# Patient Record
Sex: MALE | Race: WHITE | NOT HISPANIC OR LATINO | URBAN - METROPOLITAN AREA
[De-identification: names, ages, dates, MRNs, and addresses within clinical notes are randomized per-mention and may not be internally consistent; named-entity substitution may affect disease eponyms.]

---

## 2023-09-08 PROBLEM — I87.2 VENOUS INSUFFICIENCY: Status: ACTIVE | Noted: 2023-09-08

## 2023-09-08 PROBLEM — I26.94 MULTIPLE SUBSEGMENTAL PULMONARY EMBOLI WITHOUT ACUTE COR PULMONALE (MULTI): Status: ACTIVE | Noted: 2023-09-08

## 2023-09-08 PROBLEM — R91.1 SOLITARY PULMONARY NODULE: Status: ACTIVE | Noted: 2023-09-08

## 2023-09-08 PROBLEM — I26.93 SINGLE SUBSEGMENTAL PULMONARY EMBOLISM WITHOUT ACUTE COR PULMONALE (MULTI): Status: ACTIVE | Noted: 2023-09-08

## 2023-09-08 PROBLEM — I82.409 DEEP VENOUS THROMBOSIS OF LOWER EXTREMITY (MULTI): Status: ACTIVE | Noted: 2023-09-08

## 2023-09-08 PROBLEM — I48.20 CHRONIC ATRIAL FIBRILLATION (MULTI): Status: ACTIVE | Noted: 2023-09-08

## 2023-09-08 PROBLEM — I48.91 ATRIAL FIBRILLATION (MULTI): Status: ACTIVE | Noted: 2023-09-08

## 2023-09-08 PROBLEM — Z91.89 AT RISK FOR ASPIRATION: Status: ACTIVE | Noted: 2023-09-08

## 2023-09-08 PROBLEM — R73.09 ELEVATED HEMOGLOBIN A1C: Status: ACTIVE | Noted: 2023-09-08

## 2023-09-08 PROBLEM — N52.9 ERECTILE DYSFUNCTION: Status: ACTIVE | Noted: 2023-09-08

## 2023-09-08 PROBLEM — A41.9 SEPSIS (MULTI): Status: ACTIVE | Noted: 2023-09-08

## 2023-09-08 PROBLEM — Z91.89 AT RISK FOR BLEEDING: Status: ACTIVE | Noted: 2023-09-08

## 2023-09-08 PROBLEM — R03.0 FINDING OF ABOVE NORMAL BLOOD PRESSURE: Status: ACTIVE | Noted: 2023-09-08

## 2023-09-08 PROBLEM — E11.9 TYPE 2 DIABETES MELLITUS WITHOUT COMPLICATION, WITHOUT LONG-TERM CURRENT USE OF INSULIN (MULTI): Status: ACTIVE | Noted: 2023-09-08

## 2023-09-08 PROBLEM — Z92.89 HISTORY OF CARDIOVERSION: Status: ACTIVE | Noted: 2023-09-08

## 2023-09-08 RX ORDER — TRAMADOL HYDROCHLORIDE 50 MG/1
50 TABLET ORAL AS NEEDED
COMMUNITY
End: 2024-05-06 | Stop reason: ALTCHOICE

## 2023-09-08 RX ORDER — METOPROLOL SUCCINATE 25 MG/1
25 TABLET, EXTENDED RELEASE ORAL DAILY
COMMUNITY
End: 2023-12-06 | Stop reason: SDUPTHER

## 2023-09-08 RX ORDER — SILDENAFIL CITRATE 20 MG/1
20 TABLET ORAL DAILY PRN
COMMUNITY
Start: 2022-10-14 | End: 2024-03-27 | Stop reason: SDUPTHER

## 2023-12-02 DIAGNOSIS — I48.0 PAROXYSMAL ATRIAL FIBRILLATION (MULTI): Primary | ICD-10-CM

## 2023-12-06 DIAGNOSIS — I48.0 PAROXYSMAL ATRIAL FIBRILLATION (MULTI): ICD-10-CM

## 2023-12-06 RX ORDER — METOPROLOL SUCCINATE 25 MG/1
25 TABLET, EXTENDED RELEASE ORAL DAILY
Qty: 90 TABLET | Refills: 3 | Status: SHIPPED | OUTPATIENT
Start: 2023-12-06 | End: 2023-12-06

## 2023-12-06 RX ORDER — METOPROLOL SUCCINATE 25 MG/1
25 TABLET, EXTENDED RELEASE ORAL DAILY
Qty: 90 TABLET | Refills: 0 | Status: SHIPPED | OUTPATIENT
Start: 2023-12-06

## 2023-12-06 NOTE — TELEPHONE ENCOUNTER
Needs a refill on the Xarelto 20 mg, needs sent to [facility]. Metoprolol needs sent to [facility].

## 2024-03-27 ENCOUNTER — TELEPHONE (OUTPATIENT)
Dept: PRIMARY CARE | Facility: CLINIC | Age: 59
End: 2024-03-27
Payer: COMMERCIAL

## 2024-03-27 DIAGNOSIS — N52.9 ERECTILE DYSFUNCTION, UNSPECIFIED ERECTILE DYSFUNCTION TYPE: Primary | ICD-10-CM

## 2024-03-27 RX ORDER — SILDENAFIL CITRATE 20 MG/1
20 TABLET ORAL DAILY PRN
Qty: 30 TABLET | Refills: 0 | Status: SHIPPED | OUTPATIENT
Start: 2024-03-27 | End: 2024-05-06 | Stop reason: SDUPTHER

## 2024-03-27 NOTE — TELEPHONE ENCOUNTER
Patient request refill sildenafil 20mg to be sent to [facility] in [place].  Thank you  (Has appt with Dr. Escobar on 5/6)

## 2024-05-06 ENCOUNTER — OFFICE VISIT (OUTPATIENT)
Dept: PRIMARY CARE | Facility: CLINIC | Age: 59
End: 2024-05-06
Payer: COMMERCIAL

## 2024-05-06 VITALS
BODY MASS INDEX: 43.99 KG/M2 | HEIGHT: 71 IN | SYSTOLIC BLOOD PRESSURE: 140 MMHG | OXYGEN SATURATION: 95 % | TEMPERATURE: 97 F | HEART RATE: 81 BPM | DIASTOLIC BLOOD PRESSURE: 84 MMHG | WEIGHT: 314.2 LBS

## 2024-05-06 DIAGNOSIS — N52.9 ERECTILE DYSFUNCTION, UNSPECIFIED ERECTILE DYSFUNCTION TYPE: ICD-10-CM

## 2024-05-06 DIAGNOSIS — I48.91 ATRIAL FIBRILLATION, UNSPECIFIED TYPE (MULTI): ICD-10-CM

## 2024-05-06 DIAGNOSIS — Z76.89 ENCOUNTER TO ESTABLISH CARE WITH NEW DOCTOR: Primary | ICD-10-CM

## 2024-05-06 DIAGNOSIS — Z00.00 ROUTINE HEALTH MAINTENANCE: ICD-10-CM

## 2024-05-06 DIAGNOSIS — E11.9 TYPE 2 DIABETES MELLITUS WITHOUT COMPLICATION, WITHOUT LONG-TERM CURRENT USE OF INSULIN (MULTI): ICD-10-CM

## 2024-05-06 DIAGNOSIS — I87.2 VENOUS STASIS DERMATITIS OF LEFT LOWER EXTREMITY: ICD-10-CM

## 2024-05-06 DIAGNOSIS — Z12.5 PROSTATE CANCER SCREENING: ICD-10-CM

## 2024-05-06 LAB — POC HEMOGLOBIN A1C: 6.2 % (ref 4.2–6.5)

## 2024-05-06 PROCEDURE — 99213 OFFICE O/P EST LOW 20 MIN: CPT | Performed by: FAMILY MEDICINE

## 2024-05-06 PROCEDURE — 3077F SYST BP >= 140 MM HG: CPT | Performed by: FAMILY MEDICINE

## 2024-05-06 PROCEDURE — 3079F DIAST BP 80-89 MM HG: CPT | Performed by: FAMILY MEDICINE

## 2024-05-06 PROCEDURE — 83036 HEMOGLOBIN GLYCOSYLATED A1C: CPT | Performed by: FAMILY MEDICINE

## 2024-05-06 PROCEDURE — 99396 PREV VISIT EST AGE 40-64: CPT | Performed by: FAMILY MEDICINE

## 2024-05-06 RX ORDER — SILDENAFIL CITRATE 20 MG/1
20 TABLET ORAL DAILY PRN
Qty: 30 TABLET | Refills: 2 | Status: SHIPPED | OUTPATIENT
Start: 2024-05-06

## 2024-05-06 ASSESSMENT — ENCOUNTER SYMPTOMS
OCCASIONAL FEELINGS OF UNSTEADINESS: 0
DEPRESSION: 0
LOSS OF SENSATION IN FEET: 0

## 2024-05-06 ASSESSMENT — ANXIETY QUESTIONNAIRES
IF YOU CHECKED OFF ANY PROBLEMS ON THIS QUESTIONNAIRE, HOW DIFFICULT HAVE THESE PROBLEMS MADE IT FOR YOU TO DO YOUR WORK, TAKE CARE OF THINGS AT HOME, OR GET ALONG WITH OTHER PEOPLE: NOT DIFFICULT AT ALL
4. TROUBLE RELAXING: NOT AT ALL
6. BECOMING EASILY ANNOYED OR IRRITABLE: NOT AT ALL
5. BEING SO RESTLESS THAT IT IS HARD TO SIT STILL: NOT AT ALL
GAD7 TOTAL SCORE: 0
1. FEELING NERVOUS, ANXIOUS, OR ON EDGE: NOT AT ALL
2. NOT BEING ABLE TO STOP OR CONTROL WORRYING: NOT AT ALL
3. WORRYING TOO MUCH ABOUT DIFFERENT THINGS: NOT AT ALL
7. FEELING AFRAID AS IF SOMETHING AWFUL MIGHT HAPPEN: NOT AT ALL

## 2024-05-06 ASSESSMENT — PATIENT HEALTH QUESTIONNAIRE - PHQ9
7. TROUBLE CONCENTRATING ON THINGS, SUCH AS READING THE NEWSPAPER OR WATCHING TELEVISION: NOT AT ALL
6. FEELING BAD ABOUT YOURSELF - OR THAT YOU ARE A FAILURE OR HAVE LET YOURSELF OR YOUR FAMILY DOWN: NOT AT ALL
9. THOUGHTS THAT YOU WOULD BE BETTER OFF DEAD, OR OF HURTING YOURSELF: NOT AT ALL
10. IF YOU CHECKED OFF ANY PROBLEMS, HOW DIFFICULT HAVE THESE PROBLEMS MADE IT FOR YOU TO DO YOUR WORK, TAKE CARE OF THINGS AT HOME, OR GET ALONG WITH OTHER PEOPLE: NOT DIFFICULT AT ALL
3. TROUBLE FALLING OR STAYING ASLEEP OR SLEEPING TOO MUCH: NOT AT ALL
4. FEELING TIRED OR HAVING LITTLE ENERGY: NOT AT ALL
1. LITTLE INTEREST OR PLEASURE IN DOING THINGS: NOT AT ALL
SUM OF ALL RESPONSES TO PHQ QUESTIONS 1-9: 0
5. POOR APPETITE OR OVEREATING: NOT AT ALL
SUM OF ALL RESPONSES TO PHQ9 QUESTIONS 1 AND 2: 0
8. MOVING OR SPEAKING SO SLOWLY THAT OTHER PEOPLE COULD HAVE NOTICED. OR THE OPPOSITE, BEING SO FIGETY OR RESTLESS THAT YOU HAVE BEEN MOVING AROUND A LOT MORE THAN USUAL: NOT AT ALL
2. FEELING DOWN, DEPRESSED OR HOPELESS: NOT AT ALL

## 2024-05-06 ASSESSMENT — LIFESTYLE VARIABLES
HOW OFTEN DO YOU HAVE A DRINK CONTAINING ALCOHOL: MONTHLY OR LESS
HOW OFTEN DO YOU HAVE SIX OR MORE DRINKS ON ONE OCCASION: NEVER
HOW MANY STANDARD DRINKS CONTAINING ALCOHOL DO YOU HAVE ON A TYPICAL DAY: 1 OR 2
AUDIT-C TOTAL SCORE: 1
SKIP TO QUESTIONS 9-10: 1

## 2024-05-06 ASSESSMENT — COLUMBIA-SUICIDE SEVERITY RATING SCALE - C-SSRS
2. HAVE YOU ACTUALLY HAD ANY THOUGHTS OF KILLING YOURSELF?: NO
6. HAVE YOU EVER DONE ANYTHING, STARTED TO DO ANYTHING, OR PREPARED TO DO ANYTHING TO END YOUR LIFE?: NO
1. IN THE PAST MONTH, HAVE YOU WISHED YOU WERE DEAD OR WISHED YOU COULD GO TO SLEEP AND NOT WAKE UP?: NO

## 2024-05-06 ASSESSMENT — PAIN SCALES - GENERAL: PAINLEVEL: 0-NO PAIN

## 2024-05-06 NOTE — PATIENT INSTRUCTIONS
Problem List Items Addressed This Visit             ICD-10-CM    Type 2 diabetes mellitus without complication, without long-term current use of insulin (Multi) E11.9    Relevant Orders    POCT glycosylated hemoglobin (Hb A1C) manually resulted    Comprehensive Metabolic Panel    Lipid Panel    CBC    TSH with reflex to Free T4 if abnormal     Other Visit Diagnoses         Codes    Encounter to establish care with new doctor    -  Primary Z76.89    Routine health maintenance     Z00.00    Relevant Orders    Comprehensive Metabolic Panel    Lipid Panel    CBC    TSH with reflex to Free T4 if abnormal    Prostate Specific Antigen, Screen    Prostate cancer screening     Z12.5    Relevant Orders    Prostate Specific Antigen, Screen            Additional Visit Plans:  - Complete history and physical examination was performed      GENERAL RECOMMENDATIONS:  - I encourage you to eat a low-fat, moderate-carbohydrate, low-calorie diet to maintain a normal BMI (under 25) to reduce heart disease, and risk for diabetes  - Moderate intensity exercise for 30 minutes 5 days per week is recommended  - Helpful resources recommended by the American Academy of Family Practice can be found at www.familydoctor.org or www.choosemyplate.gov  - Can also consider enrolling in a program such as Weight Watchers or Teresa Chan. The most effective diet is going to be one you can follow long term and turn into a lifestyle. The CDC recommends losing 0.5-2 lbs a week if overweight or obese.  - I recommend a routine vision check and dental cleanings every 6 months      BLOOD TESTING:  - We will obtain routine blood work, please have this done when you are fasting. The office will notify you of the test results once they are available and make any treatment recommendations accordingly  - Blood work may include a cholesterol and diabetes screen if risk factors exist (overweight, high blood pressure etc); screening for sexually transmitted infections;  and Hepatitis C Virus screening      VACCINATION RECOMMENDATIONS:  - Flu shot annually.  - Tetanus booster every 10 years. Reportedly up to date  - Two pneumonia vaccinations starting at 65 years old (or earlier if risk factors - smoker, diabetic, heart or lung conditions) - recommended due to your diabetes - you will think about this.  - Shingles vaccine for those 50 years or older - you want to think about this      SCREENING RECOMMENDATIONS:  - Colon cancer screening (with colonoscopy or Cologuard) for men and women starting at age 45 until 74 years old (or earlier if family history of colon cancer) - next due 2027      Next Wellness Exam/Annual Physical Due  In 1 year from today    Patient Care Team:  Hira Escobar DO as PCP - General (Family Medicine)  Magdalena Ahn MD as PCP - [facility] PCP    Hira Escobar DO   05/06/24   11:28 AM

## 2024-05-06 NOTE — PROGRESS NOTES
Outpatient Visit Note    Chief Complaint   Patient presents with    Annual Exam       HPI:  Campos Robison is a 59 y.o. male here for a complete physical and to establish care.     He would also like refills on his sildenafil.  He uses this for erectile dysfunction with good results.  Denies any concerning side effects.    Health Maintenance.  Immunizations: Believes Tdap is up to date. Will think about the shingles vaccine. History of Bell's Palsy with getting the COVID vaccine. Declining pneumonia vaccine.    Denies smoking or illicit drug use, drinks 1 alcoholic beverages a day. Patient reports routine vision checks and dental cleanings, and does not get regular exercise. Patient is not fasting for routine blood work today.    Screening colonoscopy done in 2022 with Dr. Yip with 5 year clearance. Next due 2027.     Otherwise denies fevers, chills, cold/flu symptoms, SOB, CP, N/V, abdominal pain, dysuria, hematuria, melena, diarrhea or LE edema      PHQ9/GAD7:  Over the past 2 weeks, how often have you been bothered by any of the following problems?  Trouble falling or staying asleep, or sleeping too much: Not at all  Feeling tired or having little energy: Not at all  Poor appetite or overeating: Not at all  Feeling bad about yourself - or that you are a failure or have let yourself or your family down: Not at all  Trouble concentrating on things, such as reading the newspaper or watching television: Not at all  Moving or speaking so slowly that other people could have noticed? Or the opposite - being so fidgety or restless that you have been moving around a lot more than usual.: Not at all  Thoughts that you would be better off dead or hurting yourself in some way: Not at all  Patient Health Questionnaire-9 Score: 0  Over the last 2 weeks, how often have you been bothered by any of the following problems?  Feeling nervous, anxious, or on edge: Not at all  Not being able to stop or control  worrying: Not at all  Worrying too much about different things: Not at all  Trouble relaxing: Not at all  Being so restless that it is hard to sit still: Not at all  Becoming easily annoyed or irritable: Not at all  Feeling afraid as if something awful might happen: Not at all  ALPHONSE-7 Total Score: 0      Patient Active Problem List    Diagnosis Date Noted    At risk for aspiration 09/08/2023    At risk for bleeding 09/08/2023    History of cardioversion 09/08/2023    Erectile dysfunction 09/08/2023    Deep venous thrombosis of lower extremity (Multi) 09/08/2023    Elevated hemoglobin A1c 09/08/2023    Finding of above normal blood pressure 09/08/2023    Chronic atrial fibrillation (Multi) 09/08/2023    Atrial fibrillation (Multi) 09/08/2023    Multiple subsegmental pulmonary emboli without acute cor pulmonale (Multi) 09/08/2023    Single subsegmental pulmonary embolism without acute cor pulmonale (Multi) 09/08/2023    Sepsis (Multi) 09/08/2023    Solitary pulmonary nodule 09/08/2023    Type 2 diabetes mellitus without complication, without long-term current use of insulin (Multi) 09/08/2023    Venous insufficiency 09/08/2023        Past Medical History:   Diagnosis Date    Diabetes mellitus (Multi)         Current Medications  Current Outpatient Medications   Medication Instructions    metoprolol succinate XL (TOPROL-XL) 25 mg, oral, Daily    rivaroxaban (XARELTO) 20 mg, oral, Daily, With food    sildenafil (REVATIO) 20 mg, oral, Daily PRN, 1-5 tablets        Allergies  No Known Allergies     Immunizations  Immunization History   Administered Date(s) Administered    Pfizer Purple Cap SARS-CoV-2 02/25/2021        History reviewed. No pertinent surgical history.  Family History   Problem Relation Name Age of Onset    Pneumonia Mother      COPD Mother      Dementia Mother      Lung disease Mother      Other (viral cardiomyopathy) Father      Other (stage 4 pancreatic cancer) Father      Heart disease Father      Heart  attack Brother      Heart disease Brother       Social History     Tobacco Use    Smoking status: Never    Smokeless tobacco: Never   Substance Use Topics    Alcohol use: Yes    Drug use: Never     Tobacco Use: Low Risk  (5/6/2024)    Patient History     Smoking Tobacco Use: Never     Smokeless Tobacco Use: Never     Passive Exposure: Not on file        ROS  All pertinent positive symptoms are included in the history of present illness.  All other systems have been reviewed and are negative and noncontributory to this patient's current ailments.    VITAL SIGNS  Vitals:    05/06/24 1032   BP: 140/84   Pulse: 81   Temp: 36.1 °C (97 °F)   SpO2: 95%     Vitals:    05/06/24 1032   Weight: 143 kg (314 lb 3.2 oz)      Body mass index is 43.82 kg/m².     PHYSICAL EXAM  GENERAL APPEARANCE: well nourished, well developed, looks like stated age, in no acute distress, not ill or tired appearing, conversing well.   HEENT: no trauma, normocephalic. PERRLA and EOMI with normal external exam. TM's intact with no injection or effusion, no signs of infection.   NECK: no nodes, supple without rigidity, no neck mass was observed, no thyromegaly or thyroid nodules.   HEART: regular rate and rhythm, S1 and S2 heard with no murmurs or skipped beats, no carotid bruits.   LUNGS: clear to auscultation bilaterally with no wheezes, crackles or rales.   ABDOMEN: no organomegaly, soft, nontender, nondistended, normal bowel sounds, no guarding/rebound/rigidity.   EXTREMITIES: moving all extremities equally with no edema or deformities.   SKIN: normal skin color and pigmentation, normal skin turgor without rash, lesions, or nodules visualized.   NEUROLOGIC EXAM: CN II-XII grossly intact, normal gait, normal balance, 5/5 muscle strength  PSYCH: mood and affect appropriate; alert and oriented to time, place, person; no difficulty with speech or language.   LYMPH NODES: no cervical lymphadenopathy.             Assessment/Plan   Problem List Items  Addressed This Visit             ICD-10-CM    Type 2 diabetes mellitus without complication, without long-term current use of insulin (Multi) E11.9    Relevant Orders    POCT glycosylated hemoglobin (Hb A1C) manually resulted    Comprehensive Metabolic Panel    Lipid Panel    CBC    TSH with reflex to Free T4 if abnormal     Other Visit Diagnoses         Codes    Encounter to establish care with new doctor    -  Primary Z76.89    Routine health maintenance     Z00.00    Relevant Orders    Comprehensive Metabolic Panel    Lipid Panel    CBC    TSH with reflex to Free T4 if abnormal    Prostate Specific Antigen, Screen    Prostate cancer screening     Z12.5    Relevant Orders    Prostate Specific Antigen, Screen            Additional Visit Plans:  - Complete history and physical examination was performed      GENERAL RECOMMENDATIONS:  - I encourage you to eat a low-fat, moderate-carbohydrate, low-calorie diet to maintain a normal BMI (under 25) to reduce heart disease, and risk for diabetes  - Moderate intensity exercise for 30 minutes 5 days per week is recommended  - Helpful resources recommended by the American Academy of Family Practice can be found at www.familydoctor.org or www.choosemyplate.gov  - Can also consider enrolling in a program such as Weight Watchers or Teresa Carrilloig. The most effective diet is going to be one you can follow long term and turn into a lifestyle. The CDC recommends losing 0.5-2 lbs a week if overweight or obese.  - I recommend a routine vision check and dental cleanings every 6 months      BLOOD TESTING:  - We will obtain routine blood work, please have this done when you are fasting. The office will notify you of the test results once they are available and make any treatment recommendations accordingly  - Blood work may include a cholesterol and diabetes screen if risk factors exist (overweight, high blood pressure etc); screening for sexually transmitted infections; and Hepatitis C  Virus screening      VACCINATION RECOMMENDATIONS:  - Flu shot annually.  - Tetanus booster every 10 years. Reportedly up to date  - Two pneumonia vaccinations starting at 65 years old (or earlier if risk factors - smoker, diabetic, heart or lung conditions) - recommended due to your diabetes - you will think about this.  - Shingles vaccine for those 50 years or older - you want to think about this      SCREENING RECOMMENDATIONS:  - Colon cancer screening (with colonoscopy or Cologuard) for men and women starting at age 45 until 74 years old (or earlier if family history of colon cancer) - next due 2027    Refills provided on your Viagra.  Continue to use this as needed.      Next Wellness Exam/Annual Physical Due  In 1 year from today    Patient Care Team:  Hira Escobar DO as PCP - General (Family Medicine)  Magdalena Ahn MD as PCP - [facility] PCP    Hira Escobar DO   05/06/24   11:28 AM

## 2024-05-14 ENCOUNTER — APPOINTMENT (OUTPATIENT)
Dept: PRIMARY CARE | Facility: CLINIC | Age: 59
End: 2024-05-14
Payer: COMMERCIAL

## 2024-08-06 ENCOUNTER — APPOINTMENT (OUTPATIENT)
Dept: PRIMARY CARE | Facility: CLINIC | Age: 59
End: 2024-08-06
Payer: COMMERCIAL

## 2024-08-23 ENCOUNTER — TELEMEDICINE (OUTPATIENT)
Dept: PRIMARY CARE | Facility: CLINIC | Age: 59
End: 2024-08-23
Payer: COMMERCIAL

## 2024-08-23 DIAGNOSIS — E11.9 TYPE 2 DIABETES MELLITUS WITHOUT COMPLICATION, WITHOUT LONG-TERM CURRENT USE OF INSULIN (MULTI): Primary | ICD-10-CM

## 2024-08-23 DIAGNOSIS — I48.20 CHRONIC ATRIAL FIBRILLATION (MULTI): ICD-10-CM

## 2024-08-23 DIAGNOSIS — N52.9 ERECTILE DYSFUNCTION, UNSPECIFIED ERECTILE DYSFUNCTION TYPE: ICD-10-CM

## 2024-08-23 PROBLEM — Z86.718 HISTORY OF BLOOD CLOTS: Status: ACTIVE | Noted: 2024-08-23

## 2024-08-23 PROBLEM — Z86.718 HISTORY OF BLOOD CLOTS: Status: ACTIVE | Noted: 2023-09-08

## 2024-08-23 PROBLEM — A41.9 SEPSIS (MULTI): Status: RESOLVED | Noted: 2023-09-08 | Resolved: 2024-08-23

## 2024-08-23 PROBLEM — Z91.89 AT RISK FOR BLEEDING: Status: RESOLVED | Noted: 2023-09-08 | Resolved: 2024-08-23

## 2024-08-23 PROBLEM — R73.09 ELEVATED HEMOGLOBIN A1C: Status: RESOLVED | Noted: 2023-09-08 | Resolved: 2024-08-23

## 2024-08-23 PROBLEM — R03.0 FINDING OF ABOVE NORMAL BLOOD PRESSURE: Status: RESOLVED | Noted: 2023-09-08 | Resolved: 2024-08-23

## 2024-08-23 PROBLEM — Z91.89 AT RISK FOR ASPIRATION: Status: RESOLVED | Noted: 2023-09-08 | Resolved: 2024-08-23

## 2024-08-23 PROCEDURE — 99442 PR PHYS/QHP TELEPHONE EVALUATION 11-20 MIN: CPT | Performed by: FAMILY MEDICINE

## 2024-08-23 PROCEDURE — 1036F TOBACCO NON-USER: CPT | Performed by: FAMILY MEDICINE

## 2024-08-23 ASSESSMENT — PATIENT HEALTH QUESTIONNAIRE - PHQ9
1. LITTLE INTEREST OR PLEASURE IN DOING THINGS: NOT AT ALL
SUM OF ALL RESPONSES TO PHQ9 QUESTIONS 1 AND 2: 0
2. FEELING DOWN, DEPRESSED OR HOPELESS: NOT AT ALL

## 2024-08-23 NOTE — ASSESSMENT & PLAN NOTE
- A1c sugar average performed at last in person encounter showed great control on diet alone at 6.2%  - continue to focus on healthy, low-fat diet with moderation of carbohydrates/processed sugars  - annual diabetic eye exam advocated

## 2024-08-23 NOTE — ASSESSMENT & PLAN NOTE
- Stable on current regimen  -Will continue with current medication without modification  -At this time I have placed referral to electrophysiology department with plans to set up consultation at earliest convenience

## 2024-08-23 NOTE — PATIENT INSTRUCTIONS
Problem List Items Addressed This Visit             ICD-10-CM    Erectile dysfunction N52.9     - Stable on current regimen         Chronic atrial fibrillation (Multi) I48.20     - Stable on current regimen  -Will continue with current medication without modification  -At this time I have placed referral to electrophysiology department with plans to set up consultation at earliest convenience         Relevant Orders    Referral to Cardiac Electrophysiology    Type 2 diabetes mellitus without complication, without long-term current use of insulin (Multi) - Primary E11.9     - A1c sugar average performed at last in person encounter showed great control on diet alone at 6.2%  - continue to focus on healthy, low-fat diet with moderation of carbohydrates/processed sugars  - annual diabetic eye exam advocated          Please complete routine blood work at earliest convenience    Counseling:       Medication education:         Education:  The patient is counseled regarding potential side-effects of all new medications        Understanding:  Patient expressed understanding        Adherence:  No barriers to adherence identified    ** Please excuse any errors in grammar or translation related to this dictation. Voice recognition software was utilized to prepare this document. **

## 2024-08-23 NOTE — PROGRESS NOTES
Outpatient Visit Note    Chief Complaint   Patient presents with    Follow-up     3 month f/u       With patient's permission, this is a Telemedicine visit with video and audio. The provider and patient participated in this telemedicine encounter.    HPI:  Campos Robison is a 59 y.o. male who presents to the office via telemedicine encounter for 3-month follow-up.  Review of chart notes the patient was previously established with Dr. Ahn, having last been seen for [facility] care visit/annual physical with Dr. Iniguez on 5/6/2024.    Review of chart notes a past medical history significant for atrial fibrillation with prior PE/DVT on regimen of Xarelto and metoprolol succinate 25 mg daily.  Additional history includes diet controlled diabetes, venous insufficiency and erectile dysfunction on sildenafil.    A1c was performed at last encounter which showed good continued dietary control at 6.2%.  Orders were given at that time for routine blood work including CBC, CMP, lipid panel, TSH and PSA which have yet to be completed to date.    Atrial fibrillation:  States to be compliant with medication regimen denying any breakthrough symptoms or bruising/bleeding issues.  No reports of chest pain, palpitations, lightheadedness, shortness of breath or dizziness.  Review of chart notes that patient had previously been under the care of Dr. Rothman of  electrophysiology, respectively.  Cardioversion was initially successful though patient did have require second cardioversion which had minimal success.  He ultimately converted back into chronic A-fib to which he has been managing with medication alone.  States they have never had formal consultation with electrophysiology though he does find himself interested in having conversations at this time.  Going forward, if follow-up is ultimately needed with cardiology, he would prefer to link up with new specialist.    Controlled diabetes:  No reports of polyuria,  polydipsia, polyphagia or acute vision/sensation changes    Erectile dysfunction:  At last encounter, he did request refills on sildenafil, stating that the medication had continued to be effective and well-tolerated.  Denied any adverse side effects    Preventative health maintenance:  In regards to preventative health recommendations, patient has stated that Tdap was received in last 10 years though no record on file with state.  Patient notes to have had Bell's palsy with COVID-19 vaccine to which he has deferred ongoing immunization recommends including declining of pneumonia vaccine.  In regards to colon cancer screening, colonoscopy was successfully completed in 2022 with 5-year repeat recommended          Current Medications  Current Outpatient Medications   Medication Instructions    metoprolol succinate XL (TOPROL-XL) 25 mg, oral, Daily    sildenafil (REVATIO) 20 mg, oral, Daily PRN, 1-5 tablets    Xarelto 20 mg, oral, Daily, Take with food.        Allergies  No Known Allergies     Past Medical History:   Diagnosis Date    Diabetes mellitus (Multi)       History reviewed. No pertinent surgical history.  Family History   Problem Relation Name Age of Onset    Pneumonia Mother      COPD Mother      Dementia Mother      Lung disease Mother      Other (viral cardiomyopathy) Father      Other (stage 4 pancreatic cancer) Father      Heart disease Father      Heart attack Brother      Heart disease Brother       Social History     Tobacco Use    Smoking status: Never    Smokeless tobacco: Never   Vaping Use    Vaping status: Never Used   Substance Use Topics    Alcohol use: Yes    Drug use: Never     Tobacco Use: Low Risk  (8/23/2024)    Patient History     Smoking Tobacco Use: Never     Smokeless Tobacco Use: Never     Passive Exposure: Not on file        ROS  All pertinent positive symptoms are included in the history of present illness.  All other systems have been reviewed and are negative and noncontributory  to this patient's current ailments.    VITAL SIGNS  There were no vitals filed for this visit.  There were no vitals filed for this visit.   There is no height or weight on file to calculate BMI.   Patient is unable to provide    PHYSICAL EXAM  GENERAL APPEARANCE:  Alert and oriented x 3, Pleasant and cooperative, No acute distress.   LUNGS:  No conversational dyspnea or cough during encounter.   PSYCH:  appropriate mood and affect, no difficulty with speech.   Telemedicine visit, no other exam component done.      Assessment/Plan   Problem List Items Addressed This Visit             ICD-10-CM    Erectile dysfunction N52.9     - Stable on current regimen         Chronic atrial fibrillation (Multi) I48.20     - Stable on current regimen  -Will continue with current medication without modification  -At this time I have placed referral to electrophysiology department with plans to set up consultation at earliest convenience         Relevant Orders    Referral to Cardiac Electrophysiology    Type 2 diabetes mellitus without complication, without long-term current use of insulin (Multi) - Primary E11.9     - A1c sugar average performed at last in person encounter showed great control on diet alone at 6.2%  - continue to focus on healthy, low-fat diet with moderation of carbohydrates/processed sugars  - annual diabetic eye exam advocated          Please complete routine blood work at earliest convenience    Counseling:       Medication education:         Education:  The patient is counseled regarding potential side-effects of all new medications        Understanding:  Patient expressed understanding        Adherence:  No barriers to adherence identified    ** Please excuse any errors in grammar or translation related to this dictation. Voice recognition software was utilized to prepare this document. **

## 2024-12-17 ENCOUNTER — TELEPHONE (OUTPATIENT)
Dept: PRIMARY CARE | Facility: CLINIC | Age: 59
End: 2024-12-17
Payer: COMMERCIAL

## 2024-12-17 DIAGNOSIS — I48.0 PAROXYSMAL ATRIAL FIBRILLATION (MULTI): ICD-10-CM

## 2024-12-17 RX ORDER — METOPROLOL SUCCINATE 25 MG/1
25 TABLET, EXTENDED RELEASE ORAL DAILY
Qty: 90 TABLET | Refills: 1 | Status: SHIPPED | OUTPATIENT
Start: 2024-12-17

## 2024-12-17 NOTE — TELEPHONE ENCOUNTER
Refill request:    metoprolol succinate XL (Toprol-XL) 25 mg 24 hr tablet TAKE ONE TABLET BY MOUTH DAILY     #90 day supply    Pharmacy: [facility], [place]    Last OV- 08/23/24, 3 month med follow up    Next OV-12/30/24, 3 month medication follow up    Farhan # [phone]

## 2024-12-20 ENCOUNTER — LAB (OUTPATIENT)
Dept: LAB | Facility: LAB | Age: 59
End: 2024-12-20
Payer: COMMERCIAL

## 2024-12-20 DIAGNOSIS — E11.9 TYPE 2 DIABETES MELLITUS WITHOUT COMPLICATION, WITHOUT LONG-TERM CURRENT USE OF INSULIN (MULTI): ICD-10-CM

## 2024-12-20 DIAGNOSIS — Z12.5 PROSTATE CANCER SCREENING: ICD-10-CM

## 2024-12-20 DIAGNOSIS — Z00.00 ROUTINE HEALTH MAINTENANCE: ICD-10-CM

## 2024-12-20 LAB
ALBUMIN SERPL BCP-MCNC: 4.1 G/DL (ref 3.4–5)
ALP SERPL-CCNC: 86 U/L (ref 33–136)
ALT SERPL W P-5'-P-CCNC: 37 U/L (ref 10–52)
ANION GAP SERPL CALC-SCNC: 14 MMOL/L (ref 10–20)
AST SERPL W P-5'-P-CCNC: 23 U/L (ref 9–39)
BILIRUB SERPL-MCNC: 1.1 MG/DL (ref 0–1.2)
BUN SERPL-MCNC: 17 MG/DL (ref 6–23)
CALCIUM SERPL-MCNC: 8.8 MG/DL (ref 8.6–10.3)
CHLORIDE SERPL-SCNC: 100 MMOL/L (ref 98–107)
CHOLEST SERPL-MCNC: 230 MG/DL (ref 0–199)
CHOLESTEROL/HDL RATIO: 4.7
CO2 SERPL-SCNC: 30 MMOL/L (ref 21–32)
CREAT SERPL-MCNC: 0.91 MG/DL (ref 0.5–1.3)
EGFRCR SERPLBLD CKD-EPI 2021: >90 ML/MIN/1.73M*2
ERYTHROCYTE [DISTWIDTH] IN BLOOD BY AUTOMATED COUNT: 13.2 % (ref 11.5–14.5)
GLUCOSE SERPL-MCNC: 121 MG/DL (ref 74–99)
HCT VFR BLD AUTO: 50.8 % (ref 41–52)
HDLC SERPL-MCNC: 48.6 MG/DL
HGB BLD-MCNC: 16.5 G/DL (ref 13.5–17.5)
LDLC SERPL CALC-MCNC: 167 MG/DL
MCH RBC QN AUTO: 31.9 PG (ref 26–34)
MCHC RBC AUTO-ENTMCNC: 32.5 G/DL (ref 32–36)
MCV RBC AUTO: 98 FL (ref 80–100)
NON HDL CHOLESTEROL: 181 MG/DL (ref 0–149)
NRBC BLD-RTO: 0 /100 WBCS (ref 0–0)
PLATELET # BLD AUTO: 145 X10*3/UL (ref 150–450)
POTASSIUM SERPL-SCNC: 4.1 MMOL/L (ref 3.5–5.3)
PROT SERPL-MCNC: 6.5 G/DL (ref 6.4–8.2)
PSA SERPL-MCNC: 1.17 NG/ML
RBC # BLD AUTO: 5.17 X10*6/UL (ref 4.5–5.9)
SODIUM SERPL-SCNC: 140 MMOL/L (ref 136–145)
TRIGL SERPL-MCNC: 70 MG/DL (ref 0–149)
TSH SERPL-ACNC: 3.18 MIU/L (ref 0.44–3.98)
VLDL: 14 MG/DL (ref 0–40)
WBC # BLD AUTO: 6.2 X10*3/UL (ref 4.4–11.3)

## 2024-12-20 PROCEDURE — 84443 ASSAY THYROID STIM HORMONE: CPT

## 2024-12-20 PROCEDURE — 84153 ASSAY OF PSA TOTAL: CPT

## 2024-12-20 PROCEDURE — 80061 LIPID PANEL: CPT

## 2024-12-20 PROCEDURE — 80053 COMPREHEN METABOLIC PANEL: CPT

## 2024-12-20 PROCEDURE — 85027 COMPLETE CBC AUTOMATED: CPT

## 2024-12-20 PROCEDURE — 36415 COLL VENOUS BLD VENIPUNCTURE: CPT

## 2024-12-30 ENCOUNTER — TELEPHONE (OUTPATIENT)
Dept: PRIMARY CARE | Facility: CLINIC | Age: 59
End: 2024-12-30

## 2024-12-30 ENCOUNTER — OFFICE VISIT (OUTPATIENT)
Dept: PRIMARY CARE | Facility: CLINIC | Age: 59
End: 2024-12-30
Payer: COMMERCIAL

## 2024-12-30 VITALS
WEIGHT: 315 LBS | OXYGEN SATURATION: 98 % | DIASTOLIC BLOOD PRESSURE: 82 MMHG | TEMPERATURE: 94 F | HEART RATE: 91 BPM | SYSTOLIC BLOOD PRESSURE: 134 MMHG | BODY MASS INDEX: 44.1 KG/M2 | HEIGHT: 71 IN

## 2024-12-30 DIAGNOSIS — E11.9 TYPE 2 DIABETES MELLITUS WITHOUT COMPLICATION, WITHOUT LONG-TERM CURRENT USE OF INSULIN (MULTI): Primary | ICD-10-CM

## 2024-12-30 DIAGNOSIS — E78.00 PURE HYPERCHOLESTEROLEMIA: ICD-10-CM

## 2024-12-30 DIAGNOSIS — I48.0 PAROXYSMAL ATRIAL FIBRILLATION (MULTI): ICD-10-CM

## 2024-12-30 DIAGNOSIS — D69.6 THROMBOCYTOPENIA (CMS-HCC): ICD-10-CM

## 2024-12-30 DIAGNOSIS — I48.20 CHRONIC ATRIAL FIBRILLATION (MULTI): ICD-10-CM

## 2024-12-30 LAB — POC HEMOGLOBIN A1C: 6.4 % (ref 4.2–6.5)

## 2024-12-30 PROCEDURE — 3008F BODY MASS INDEX DOCD: CPT | Performed by: FAMILY MEDICINE

## 2024-12-30 PROCEDURE — 3079F DIAST BP 80-89 MM HG: CPT | Performed by: FAMILY MEDICINE

## 2024-12-30 PROCEDURE — 3075F SYST BP GE 130 - 139MM HG: CPT | Performed by: FAMILY MEDICINE

## 2024-12-30 PROCEDURE — 1036F TOBACCO NON-USER: CPT | Performed by: FAMILY MEDICINE

## 2024-12-30 PROCEDURE — 99214 OFFICE O/P EST MOD 30 MIN: CPT | Performed by: FAMILY MEDICINE

## 2024-12-30 PROCEDURE — 83036 HEMOGLOBIN GLYCOSYLATED A1C: CPT | Performed by: FAMILY MEDICINE

## 2024-12-30 PROCEDURE — 3050F LDL-C >= 130 MG/DL: CPT | Performed by: FAMILY MEDICINE

## 2024-12-30 RX ORDER — RIVAROXABAN 20 MG/1
20 TABLET, FILM COATED ORAL DAILY
Qty: 90 TABLET | Refills: 3 | Status: SHIPPED | OUTPATIENT
Start: 2024-12-30

## 2024-12-30 RX ORDER — TIRZEPATIDE 2.5 MG/.5ML
2.5 INJECTION, SOLUTION SUBCUTANEOUS WEEKLY
Qty: 4 PEN | Refills: 0 | Status: SHIPPED | OUTPATIENT
Start: 2024-12-30 | End: 2025-01-29

## 2024-12-30 RX ORDER — ATORVASTATIN CALCIUM 10 MG/1
10 TABLET, FILM COATED ORAL DAILY
Qty: 90 TABLET | Refills: 1 | Status: SHIPPED | OUTPATIENT
Start: 2024-12-30 | End: 2025-06-28

## 2024-12-30 ASSESSMENT — PATIENT HEALTH QUESTIONNAIRE - PHQ9
1. LITTLE INTEREST OR PLEASURE IN DOING THINGS: NOT AT ALL
2. FEELING DOWN, DEPRESSED OR HOPELESS: NOT AT ALL
SUM OF ALL RESPONSES TO PHQ9 QUESTIONS 1 AND 2: 0

## 2024-12-30 ASSESSMENT — PAIN SCALES - GENERAL: PAINLEVEL_OUTOF10: 0-NO PAIN

## 2024-12-30 NOTE — ASSESSMENT & PLAN NOTE
- Stable on current regimen  -Will continue with current medication without modification  -Cardiology referral given

## 2024-12-30 NOTE — ASSESSMENT & PLAN NOTE
- A1c sugar average performed in office today which was 6.4%; this is stable with continued good control  - continue to focus on healthy, low-fat diet with moderation of carbohydrates/processed sugars  -Secondary to ongoing struggles with inability to lose weight despite lifestyle modification, will plan to initiate trial of Mounjaro no with plans for close monitoring of tolerance/effectiveness; have additionally placed referral to clinical pharmacy team to monitor progress  - annual diabetic eye exam advocated

## 2024-12-30 NOTE — PATIENT INSTRUCTIONS
Problem List Items Addressed This Visit             ICD-10-CM    Chronic atrial fibrillation (Multi) I48.20     - Stable on current regimen  -Will continue with current medication without modification  -Cardiology referral given           Relevant Orders    Lipid panel    Referral to Cardiology    Type 2 diabetes mellitus without complication, without long-term current use of insulin (Multi) - Primary E11.9     - A1c sugar average performed in office today which was 6.4%; this is stable with continued good control  - continue to focus on healthy, low-fat diet with moderation of carbohydrates/processed sugars  -Secondary to ongoing struggles with inability to lose weight despite lifestyle modification, will plan to initiate trial of Mounjaro no with plans for close monitoring of tolerance/effectiveness; have additionally placed referral to clinical pharmacy team to monitor progress  - annual diabetic eye exam advocated         Relevant Medications    atorvastatin (Lipitor) 10 mg tablet    tirzepatide (Mounjaro) 2.5 mg/0.5 mL pen injector    Other Relevant Orders    POCT glycosylated hemoglobin (Hb A1C) manually resulted (Completed)    Lipid panel    Referral to Clinical Pharmacy    Pure hypercholesterolemia E78.00     - Blood work showed elevated cholesterol levels to which cholesterol-lowering medication is advocated; will trial atorvastatin 10 mg daily  -Continue to focus on lifestyle modification         Relevant Medications    atorvastatin (Lipitor) 10 mg tablet    Other Relevant Orders    Lipid panel    Referral to Cardiology    Thrombocytopenia (CMS-HCC) D69.6     - Blood work showed mildly reduced platelet count to which we will monitor         Relevant Orders    CBC and Auto Differential       Counseling:       Medication education:         Education:  The patient is counseled regarding potential side-effects of all new medications        Understanding:  Patient expressed understanding        Adherence:  No  barriers to adherence identified    ** Please excuse any errors in grammar or translation related to this dictation. Voice recognition software was utilized to prepare this document. **

## 2024-12-30 NOTE — TELEPHONE ENCOUNTER
LV:   FV:    Refill:  rivaroxaban (Xarelto) 20 mg tablet TAKE 1 TABLET BY MOUTH DAILY WITH FOOD     Pharm:  [facility]

## 2024-12-30 NOTE — PROGRESS NOTES
Outpatient Visit Note    Chief Complaint   Patient presents with    Follow-up     Med f/u         HPI:  Campos Robison is a 59 y.o. male who presents to the office for medication follow-up.      Patient was previously established with Dr. Ahn, having last been seen for establishing care visit/annual physical with Dr. Iniguez on 5/6/2024.  He was last seen by myself on 8/23/2024 via telemedicine encounter.    Review of chart notes a past medical history significant for atrial fibrillation with prior PE/DVT on regimen of Xarelto and metoprolol succinate 25 mg daily.  Additional history includes diet controlled diabetes, venous insufficiency and erectile dysfunction on sildenafil.    A1c was performed in May 2024 which showed good continued dietary control at 6.2%.  Blood work recently completed on 12/20/2024 including CBC, CMP, lipid panel, PSA and TSH.  Blood work was remarkable for minimally low platelet count hyperglycemia and hyperlipidemia    Atrial fibrillation:  Is compliant with medication regimen denying any breakthrough symptoms or bruising/bleeding issues.  No reports of chest pain, palpitations, lightheadedness, shortness of breath or dizziness.  Review of chart notes that patient had previously been under the care of Dr. Rothman of  electrophysiology, respectively.  Cardioversion was initially successful though patient required second cardioversion which had minimal success.  He ultimately converted back into chronic A-fib to which he has been managing with medication alone.  Referral was given for new electrophysiologist consultation at last encounter though no appointment was completed.    Controlled diabetes:  No reports of polyuria, polydipsia, polyphagia or acute vision/sensation changes.  Does state to maintain a generally stable diet though he has struggled with weight loss.  Expressed interest in trial of Zepbound, having researched the medications with his wife.    Erectile  dysfunction:  At last encounter, he did request refills on sildenafil, stating that the medication had continued to be effective and well-tolerated.  Denied any adverse side effects    Preventative health maintenance:  In regards to preventative health recommendations, patient has stated that Tdap was received in last 10 years though no record on file with state.  Patient notes to have had Bell's palsy with COVID-19 vaccine to which he has deferred ongoing immunization recommends including declining of pneumonia vaccine.  In regards to colon cancer screening, colonoscopy was successfully completed in 2022 with 5-year repeat recommended    Current Medications  Current Outpatient Medications   Medication Instructions    atorvastatin (LIPITOR) 10 mg, oral, Daily    metoprolol succinate XL (TOPROL-XL) 25 mg, oral, Daily    Mounjaro 2.5 mg, subcutaneous, Weekly    sildenafil (REVATIO) 20 mg, oral, Daily PRN, 1-5 tablets    Xarelto 20 mg, oral, Daily, Take with food.        Allergies  No Known Allergies     Past Medical History:   Diagnosis Date    Atrial fibrillation (Multi)     Diabetes mellitus (Multi)       History reviewed. No pertinent surgical history.  Family History   Problem Relation Name Age of Onset    Pneumonia Mother      COPD Mother      Dementia Mother      Lung disease Mother      Other (viral cardiomyopathy) Father      Other (stage 4 pancreatic cancer) Father      Heart disease Father      Heart attack Brother      Heart disease Brother       Social History     Tobacco Use    Smoking status: Never    Smokeless tobacco: Never   Vaping Use    Vaping status: Never Used   Substance Use Topics    Alcohol use: Yes    Drug use: Never       ROS  All pertinent positive symptoms are included in the history of present illness.  All other systems have been reviewed and are negative and noncontributory to this patient's current ailments.    VITAL SIGNS  Vitals:    12/30/24 0802   BP: 134/82   Pulse: 91   Temp: 34.4  °C (94 °F)   SpO2: 98%       PHYSICAL EXAM  GENERAL APPEARANCE: alert and oriented, Pleasant and cooperative, No Acute Distress  HEENT: EOMI, PERRLA, MMM  HEART: RRR, normal S1S2, no murmurs, click or rubs  LUNGS: clear to auscultation bilaterally, no wheezes/rhonchi/rales  EXTREMITIES: no edema, normal ROM  SKIN: normal, no rash, unremarkable  NEUROLOGIC EXAM: non-focal exam  MUSCULOSKELETAL: no gross abnormalities  PSYCH: affect is normal, eye contact is good      Assessment/Plan   Problem List Items Addressed This Visit             ICD-10-CM    Chronic atrial fibrillation (Multi) I48.20     - Stable on current regimen  -Will continue with current medication without modification  -Cardiology referral given           Relevant Orders    Lipid panel    Referral to Cardiology    Type 2 diabetes mellitus without complication, without long-term current use of insulin (Multi) - Primary E11.9     - A1c sugar average performed in office today which was 6.4%; this is stable with continued good control  - continue to focus on healthy, low-fat diet with moderation of carbohydrates/processed sugars  -Secondary to ongoing struggles with inability to lose weight despite lifestyle modification, will plan to initiate trial of Mounjaro no with plans for close monitoring of tolerance/effectiveness; have additionally placed referral to clinical pharmacy team to monitor progress  - annual diabetic eye exam advocated         Relevant Medications    atorvastatin (Lipitor) 10 mg tablet    tirzepatide (Mounjaro) 2.5 mg/0.5 mL pen injector    Other Relevant Orders    POCT glycosylated hemoglobin (Hb A1C) manually resulted (Completed)    Lipid panel    Referral to Clinical Pharmacy    Pure hypercholesterolemia E78.00     - Blood work showed elevated cholesterol levels to which cholesterol-lowering medication is advocated; will trial atorvastatin 10 mg daily  -Continue to focus on lifestyle modification         Relevant Medications     atorvastatin (Lipitor) 10 mg tablet    Other Relevant Orders    Lipid panel    Referral to Cardiology    Thrombocytopenia (CMS-[facility]) D69.6     - Blood work showed mildly reduced platelet count to which we will monitor         Relevant Orders    CBC and Auto Differential       Counseling:       Medication education:         Education:  The patient is counseled regarding potential side-effects of all new medications        Understanding:  Patient expressed understanding        Adherence:  No barriers to adherence identified    ** Please excuse any errors in grammar or translation related to this dictation. Voice recognition software was utilized to prepare this document. **

## 2025-01-07 ENCOUNTER — APPOINTMENT (OUTPATIENT)
Dept: PHARMACY | Facility: HOSPITAL | Age: 60
End: 2025-01-07
Payer: COMMERCIAL

## 2025-01-07 DIAGNOSIS — E11.9 TYPE 2 DIABETES MELLITUS WITHOUT COMPLICATION, WITHOUT LONG-TERM CURRENT USE OF INSULIN (MULTI): ICD-10-CM

## 2025-01-07 NOTE — ASSESSMENT & PLAN NOTE
Patient's goal A1c is < 7%.  Is pt at goal? Yes, 6.4  Patient's SMBGs are unknown at this time. Patient with A1C that has been increasing. Wish to start GLP1 to reduce sugars and weight loss   Rationale for plan: Start Mounjaro.    Medication Changes:  START  Mounjaro 2.5 mg once weekly

## 2025-01-07 NOTE — PROGRESS NOTES
Clinical Pharmacy Appointment    Patient ID: Campos Robison is a 59 y.o. male who presents for Diabetes.    Pt is here for First appointment.     Referring Provider/PCP: Paul Escobar, DO  Last visit with PCP: 12/30/24   Next visit with PCP: Not yet scheduled      Subjective     HPI  DIABETES MELLITUS TYPE II:    Diagnosed with diabetes: 2021. Known diabetic complications: None.  Does patient follow with Endocrinology: No  Last optometry exam: Unknown  Most recent visit in Podiatry: Unknown  Starting Weight: 322 lbs  Goal Weight: 225    Current diabetic medications include:  Mounjaro 2.5 mg once weekly on Tuesdays    Clarifications to above regimen: Has not started yet  Adverse Effects: None    Past diabetic medications include:  None    Glucose Readings:  Glucometer/CGM Type: Glucometer  Patient tests BG 0 times per day    Current home BG readings (mg/dL): Unknown   Previous home BG readings (mg/dL): N/A    Any episodes of hypoglycemia? No,   .  Did patient treat episode of hypoglycemia appropriately? N/A  Does the patient have a prescription for ready-to-use Glucagon? Not on insulin  Does pt have proteinuria? Unknown    Lifestyle:  Diet: Unknown meals/day.  BK: Unknown  LN: Unknown  DN: Unknown  Snacks: Unknown  Drinks: Unknown  Physical Activity: Unknown    Secondary Prevention:  Statin? Yes  ACE-I/ARB? No  Aspirin? No    Pertinent PMH Review:  PMH of Pancreatitis: No  PMH of Retinopathy: No  PMH of Urinary Tract Infections: No  PMH of MTC: No    Immunizations:  Influenza? No  COVID? No  Pneumonia? No  Shingles? No  Hepatitis B? No    Medication Reconciliation:  Changed:   Added:   Discontinued:     Drug Interactions  No relevant drug interactions were noted.    Medication System Management  Patients preferred pharmacy: [facility]  Adherence/Organization: No issues reported  Affordability/Accessibility: Copay for Mounjaro reports around $150. Sent link for copay card      Objective   No Known  "Allergies  Social History     Social History Narrative    Not on file      Medication Review  Current Outpatient Medications   Medication Instructions    atorvastatin (LIPITOR) 10 mg, oral, Daily    metoprolol succinate XL (TOPROL-XL) 25 mg, oral, Daily    Mounjaro 2.5 mg, subcutaneous, Weekly    sildenafil (REVATIO) 20 mg, oral, Daily PRN, 1-5 tablets    Xarelto 20 mg, oral, Daily, Take with food.      Vitals  BP Readings from Last 2 Encounters:   12/30/24 134/82   05/06/24 140/84     BMI Readings from Last 1 Encounters:   12/30/24 44.91 kg/m²      Labs  A1C  Lab Results   Component Value Date    HGBA1C 6.4 12/30/2024    HGBA1C 6.2 05/06/2024    HGBA1C 6.1 (H) 05/03/2023     BMP  Lab Results   Component Value Date    CALCIUM 8.8 12/20/2024     12/20/2024    K 4.1 12/20/2024    CO2 30 12/20/2024     12/20/2024    BUN 17 12/20/2024    CREATININE 0.91 12/20/2024    EGFR >90 12/20/2024     LFTs  Lab Results   Component Value Date    ALT 37 12/20/2024    AST 23 12/20/2024    ALKPHOS 86 12/20/2024    BILITOT 1.1 12/20/2024     FLP  Lab Results   Component Value Date    TRIG 70 12/20/2024    CHOL 230 (H) 12/20/2024    LDLCALC 167 (H) 12/20/2024    HDL 48.6 12/20/2024     Urine Microalbumin  No results found for: \"MICROALBCREA\"  Weight Management  Wt Readings from Last 3 Encounters:   12/30/24 146 kg (322 lb)   05/06/24 143 kg (314 lb 3.2 oz)   07/24/23 134 kg (295 lb 3.2 oz)      There is no height or weight on file to calculate BMI.     Assessment/Plan   Problem List Items Addressed This Visit       Type 2 diabetes mellitus without complication, without long-term current use of insulin (Multi)     Patient's goal A1c is < 7%.  Is pt at goal? Yes, 6.4  Patient's SMBGs are unknown at this time. Patient with A1C that has been increasing. Wish to start GLP1 to reduce sugars and weight loss   Rationale for plan: Start Mounjaro.    Medication Changes:  START  Mounjaro 2.5 mg once weekly           Relevant Orders    " Referral to Clinical Pharmacy       Clinical Pharmacist follow-up: 1/23 at 1140, Telehealth visit    Continue all meds under the continuation of care with the referring provider and clinical pharmacy team.    Thank you,  Anusha Barahona, PharmD, [facility]  Clinical Pharmacy Specialist  [phone]    Verbal consent to manage patient's drug therapy was obtained from the patient. They were informed they may decline to participate or withdraw from participation in pharmacy services at any time.

## 2025-01-10 DIAGNOSIS — N52.9 ERECTILE DYSFUNCTION, UNSPECIFIED ERECTILE DYSFUNCTION TYPE: ICD-10-CM

## 2025-01-10 NOTE — TELEPHONE ENCOUNTER
LV: 12/30/24 dm  FV: pharm 1/23/25      Refill:  sildenafil (Revatio) 20 mg tablet Take 1 tablet (20 mg) by mouth once daily as needed (as needed). 1-5 tablets       Pharm:  [facility] [place]

## 2025-01-12 RX ORDER — SILDENAFIL CITRATE 20 MG/1
20 TABLET ORAL DAILY PRN
Qty: 30 TABLET | Refills: 2 | Status: SHIPPED | OUTPATIENT
Start: 2025-01-12

## 2025-01-23 ENCOUNTER — APPOINTMENT (OUTPATIENT)
Dept: PHARMACY | Facility: HOSPITAL | Age: 60
End: 2025-01-23
Payer: COMMERCIAL

## 2025-01-23 DIAGNOSIS — E11.9 TYPE 2 DIABETES MELLITUS WITHOUT COMPLICATION, WITHOUT LONG-TERM CURRENT USE OF INSULIN (MULTI): ICD-10-CM

## 2025-01-23 RX ORDER — TIRZEPATIDE 5 MG/.5ML
5 INJECTION, SOLUTION SUBCUTANEOUS
Qty: 2 ML | Refills: 3 | Status: SHIPPED | OUTPATIENT
Start: 2025-01-23

## 2025-01-23 NOTE — PROGRESS NOTES
Clinical Pharmacy Appointment    Patient ID: Campos Robison is a 59 y.o. male who presents for Diabetes.    Pt is here for Follow Up appointment.     Referring Provider/PCP: Paul Escobar, DO  Last visit with PCP: 12/30/24   Next visit with PCP: Not yet scheduled      Subjective     HPI  DIABETES MELLITUS TYPE II:    Diagnosed with diabetes: 2021. Known diabetic complications: None.  Does patient follow with Endocrinology: No  Last optometry exam: Unknown  Most recent visit in Podiatry: Unknown  Starting Weight: 315 lbs  Current Weight: 304 lbs  Goal Weight: 225 lbs    Current diabetic medications include:  Mounjaro 2.5 mg once weekly on Tuesdays x 3    Clarifications to above regimen: None  Adverse Effects: Slight constipation    Past diabetic medications include:  None    Glucose Readings:  Glucometer/CGM Type: Glucometer  Patient tests BG 0 times per day    Current home BG readings (mg/dL): Unknown   Previous home BG readings (mg/dL): N/A    Any episodes of hypoglycemia? No,   .  Did patient treat episode of hypoglycemia appropriately? N/A  Does the patient have a prescription for ready-to-use Glucagon? Not on insulin  Does pt have proteinuria? Unknown    Lifestyle: (Not hungry)  Diet: 2 meals/day.  BK: Skips  LN: Unknown  DN: Unknown  Snacks: Unknown  Drinks: Water  Physical Activity: Unknown    Secondary Prevention:  Statin? Yes  ACE-I/ARB? No  Aspirin? No    Pertinent PMH Review:  PMH of Pancreatitis: No  PMH of Retinopathy: No  PMH of Urinary Tract Infections: No  PMH of MTC: No    Immunizations:  Influenza? No  COVID? No  Pneumonia? No  Shingles? No  Hepatitis B? No    Medication Reconciliation:  Changed:   Added:   Discontinued:     Drug Interactions  No relevant drug interactions were noted.    Medication System Management  Patients preferred pharmacy: [facility]  Adherence/Organization: No issues reported  Affordability/Accessibility: Copay for Mounjaro reports around $300. Sent link for  "copay card, patient needs to sign up for.      Objective   No Known Allergies  Social History     Social History Narrative    Not on file      Medication Review  Current Outpatient Medications   Medication Instructions    atorvastatin (LIPITOR) 10 mg, oral, Daily    metoprolol succinate XL (TOPROL-XL) 25 mg, oral, Daily    Mounjaro 5 mg, subcutaneous, Every 7 days    sildenafil (REVATIO) 20 mg, oral, Daily PRN, 1-5 tablets    Xarelto 20 mg, oral, Daily, Take with food.      Vitals  BP Readings from Last 2 Encounters:   12/30/24 134/82   05/06/24 140/84     BMI Readings from Last 1 Encounters:   12/30/24 44.91 kg/m²      Labs  A1C  Lab Results   Component Value Date    HGBA1C 6.4 12/30/2024    HGBA1C 6.2 05/06/2024    HGBA1C 6.1 (H) 05/03/2023     BMP  Lab Results   Component Value Date    CALCIUM 8.8 12/20/2024     12/20/2024    K 4.1 12/20/2024    CO2 30 12/20/2024     12/20/2024    BUN 17 12/20/2024    CREATININE 0.91 12/20/2024    EGFR >90 12/20/2024     LFTs  Lab Results   Component Value Date    ALT 37 12/20/2024    AST 23 12/20/2024    ALKPHOS 86 12/20/2024    BILITOT 1.1 12/20/2024     FLP  Lab Results   Component Value Date    TRIG 70 12/20/2024    CHOL 230 (H) 12/20/2024    LDLCALC 167 (H) 12/20/2024    HDL 48.6 12/20/2024     Urine Microalbumin  No results found for: \"MICROALBCREA\"  Weight Management  Wt Readings from Last 3 Encounters:   12/30/24 146 kg (322 lb)   05/06/24 143 kg (314 lb 3.2 oz)   07/24/23 134 kg (295 lb 3.2 oz)      There is no height or weight on file to calculate BMI.     Assessment/Plan   Problem List Items Addressed This Visit       Type 2 diabetes mellitus without complication, without long-term current use of insulin (Multi)     Patient's goal A1c is < 7%.  Is pt at goal? Yes, 6.4  Patient's SMBGs are unknown at this time. Patient with A1C that has been increasing. Tolerating well.  Rationale for plan: Increase Mounjaro.    Medication Changes:  INCREASE  Mounjaro 2.5 to " 5 mg once weekly           Relevant Medications    tirzepatide (Mounjaro) 5 mg/0.5 mL pen injector    Other Relevant Orders    Referral to Clinical Pharmacy     Clinical Pharmacist follow-up: 2/20 at 1120, Telehealth visit    Continue all meds under the continuation of care with the referring provider and clinical pharmacy team.    Thank you,  Anusha Barahona, PharmD, [facility]  Clinical Pharmacy Specialist  [phone]    Verbal consent to manage patient's drug therapy was obtained from the patient. They were informed they may decline to participate or withdraw from participation in pharmacy services at any time.

## 2025-01-23 NOTE — ASSESSMENT & PLAN NOTE
Patient's goal A1c is < 7%.  Is pt at goal? Yes, 6.4  Patient's SMBGs are unknown at this time. Patient with A1C that has been increasing. Tolerating well.  Rationale for plan: Increase Mounjaro.    Medication Changes:  INCREASE  Mounjaro 2.5 to 5 mg once weekly

## 2025-02-20 ENCOUNTER — APPOINTMENT (OUTPATIENT)
Dept: PHARMACY | Facility: HOSPITAL | Age: 60
End: 2025-02-20
Payer: COMMERCIAL

## 2025-02-20 DIAGNOSIS — E11.9 TYPE 2 DIABETES MELLITUS WITHOUT COMPLICATION, WITHOUT LONG-TERM CURRENT USE OF INSULIN (MULTI): ICD-10-CM

## 2025-02-20 RX ORDER — TIRZEPATIDE 7.5 MG/.5ML
7.5 INJECTION, SOLUTION SUBCUTANEOUS
Qty: 2 ML | Refills: 3 | Status: SHIPPED | OUTPATIENT
Start: 2025-02-20

## 2025-02-20 NOTE — PROGRESS NOTES
Clinical Pharmacy Appointment    Patient ID: Campos Robison is a 59 y.o. male who presents for Diabetes.    Pt is here for Follow Up appointment.     Referring Provider/PCP: Paul Escobar,   Last visit with PCP: 12/30/24   Next visit with PCP: Not yet scheduled      Subjective     HPI  DIABETES MELLITUS TYPE II:    Diagnosed with diabetes: 2021. Known diabetic complications: None.  Does patient follow with Endocrinology: No  Last optometry exam: Occupational exams regularly  Most recent visit in Podiatry: Unknown  Starting Weight: 315 lbs  Current Weight: 296 lbs  Goal Weight: 225 lbs    Current diabetic medications include:  Mounjaro 5 mg once weekly on Tuesdays x 3    Clarifications to above regimen: None  Adverse Effects: Slight constipation    Past diabetic medications include:  None    Glucose Readings:  Glucometer/CGM Type: Glucometer  Patient tests BG 0 times per day    Current home BG readings (mg/dL): Unknown   Previous home BG readings (mg/dL): N/A    Any episodes of hypoglycemia? No,   .  Did patient treat episode of hypoglycemia appropriately? N/A  Does the patient have a prescription for ready-to-use Glucagon? Not on insulin  Does pt have proteinuria? Unknown    Lifestyle: (Not hungry)  Diet: 2 meals/day.  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 Yes  ACE-I/ARB? No  Aspirin? No    Pertinent PMH Review:  PMH of Pancreatitis: No  PMH of Retinopathy: No  PMH of Urinary Tract Infections: No  PMH of MTC: No    Immunizations:  Influenza? No  COVID? No  Pneumonia? No  Shingles? No  Hepatitis B? No    Medication Reconciliation:  Changed:   Added:   Discontinued:     Drug Interactions  No relevant drug interactions were noted.    Medication System Management  [facility] [facility] [address]  Adherence/Organization: No issues reported  Affordability/Accessibility: Copay for Mounjaro $301. Sent link for copay card, patient needs to sign up for.      Objective   No Known Allergies  Social History     Social History Narrative    Not on file      Medication Review  Current Outpatient Medications   Medication Instructions    atorvastatin (LIPITOR) 10 mg, oral, Daily    metoprolol succinate XL (TOPROL-XL) 25 mg, oral, Daily    Mounjaro 7.5 mg, subcutaneous, Every 7 days    sildenafil (REVATIO) 20 mg, oral, Daily PRN, 1-5 tablets    Xarelto 20 mg, oral, Daily, Take with food.      Vitals  BP Readings from Last 2 Encounters:   12/30/24 134/82   05/06/24 140/84     BMI Readings from Last 1 Encounters:   12/30/24 44.91 kg/m²      Labs  A1C  Lab Results   Component Value Date    HGBA1C 6.4 12/30/2024    HGBA1C 6.2 05/06/2024    HGBA1C 6.1 (H) 05/03/2023     BMP  Lab Results   Component Value Date    CALCIUM 8.8 12/20/2024     12/20/2024    K 4.1 12/20/2024    CO2 30 12/20/2024     12/20/2024    BUN 17 12/20/2024    CREATININE 0.91 12/20/2024    EGFR >90 12/20/2024     LFTs  Lab Results   Component Value Date    ALT 37 12/20/2024    AST 23 12/20/2024    ALKPHOS 86 12/20/2024    BILITOT 1.1 12/20/2024     FLP  Lab Results   Component Value Date    TRIG 70 12/20/2024    CHOL 230 (H) 12/20/2024    LDLCALC 167 (H) 12/20/2024    HDL 48.6 12/20/2024     Urine Microalbumin  No results found for: \"MICROALBCREA\"  Weight Management  Wt Readings from Last 3 Encounters:   12/30/24 146 kg (322 lb)   05/06/24 143 kg (314 lb 3.2 oz)   07/24/23 134 kg (295 lb 3.2 oz)      There is no height or weight on file to calculate BMI.     Assessment/Plan   Problem List Items Addressed This Visit       Type 2 diabetes mellitus without complication, without long-term current use of insulin (Multi)     Patient's goal A1c is < 7%.  Is pt at goal? Yes, 6.4  Patient's SMBGs are unknown at this time. Patient with A1C that has " been increasing. Tolerating well.  Rationale for plan: Increase Mounjaro.    Medication Changes:  INCREASE  Mounjaro 5 to 7.5 mg once weekly           Relevant Medications    tirzepatide (Mounjaro) 7.5 mg/0.5 mL pen injector    Other Relevant Orders    Referral to Clinical Pharmacy       Clinical Pharmacist follow-up: 3/20 at 1120, Telehealth visit    Continue all meds under the continuation of care with the referring provider and clinical pharmacy team.    Thank you,  Anusha Barahona, PharmD, [facility]  Clinical Pharmacy Specialist  [phone]    Verbal consent to manage patient's drug therapy was obtained from the patient. They were informed they may decline to participate or withdraw from participation in pharmacy services at any time.

## 2025-02-20 NOTE — ASSESSMENT & PLAN NOTE
Patient's goal A1c is < 7%.  Is pt at goal? Yes, 6.4  Patient's SMBGs are unknown at this time. Patient with A1C that has been increasing. Tolerating well.  Rationale for plan: Increase Mounjaro.    Medication Changes:  INCREASE  Mounjaro 5 to 7.5 mg once weekly

## 2025-03-18 NOTE — PROGRESS NOTES
Clinical Pharmacy Appointment    Patient ID: Campos Robison is a 59 y.o. male who presents for No chief complaint on file..    Pt is here for Follow Up appointment.     Referring Provider/PCP: Paul Escobar DO  Last visit with PCP: 12/30/24   Next visit with PCP: Not yet scheduled      Subjective     HPI  DIABETES MELLITUS TYPE II:    Diagnosed with diabetes: 2021.   Known diabetic complications: None.  Does patient follow with Endocrinology: No  Last optometry exam: Occupational exams regularly  Most recent visit in Podiatry: Unknown  Starting Weight: 315 lbs  Current Weight: 287 lbs  Goal Weight: 225 lbs    Current diabetic medications include:  Mounjaro 7.5 mg once weekly on Tuesdays x 3    Clarifications to above regimen: None  Adverse Effects: Slight constipation    Past diabetic medications include:  None    Glucose Readings:  Glucometer/CGM Type: Glucometer  Patient tests BG 0 times per day    Current home BG readings (mg/dL): Unknown   Previous home BG readings (mg/dL): N/A    Any episodes of hypoglycemia? No,   .  Did patient treat episode of hypoglycemia appropriately? N/A  Does the patient have a prescription for ready-to-use Glucagon? Not on insulin  Does pt have proteinuria? Unknown    Lifestyle: (Not hungry)  Diet: 2 meals/day.  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 Yes  ACE-I/ARB? No  Aspirin? No    Pertinent PMH Review:  PMH of Pancreatitis: No  PMH of Retinopathy: No  PMH of Urinary Tract Infections: No  PMH of MTC: No    Immunizations:  Influenza? No  COVID? No  Pneumonia? No  Shingles? No  Hepatitis B? No    Medication Reconciliation:  Changed:   Added:   Discontinued:     Drug Interactions  No relevant drug interactions were noted.    Medication System Management  Patients  "preferred pharmacy: [facility]  Adherence/Organization: No issues reported  Affordability/Accessibility: Copay for Mounjaro $301. Sent link for copay card, patient needs to sign up for.      Objective   No Known Allergies  Social History     Social History Narrative    Not on file      Medication Review  Current Outpatient Medications   Medication Instructions    atorvastatin (LIPITOR) 10 mg, oral, Daily    metoprolol succinate XL (TOPROL-XL) 25 mg, oral, Daily    Mounjaro 10 mg, subcutaneous, Every 7 days    sildenafil (REVATIO) 20 mg, oral, Daily PRN, 1-5 tablets    Xarelto 20 mg, oral, Daily, Take with food.      Vitals  BP Readings from Last 2 Encounters:   12/30/24 134/82   05/06/24 140/84     BMI Readings from Last 1 Encounters:   12/30/24 44.91 kg/m²      Labs  A1C  Lab Results   Component Value Date    HGBA1C 6.4 12/30/2024    HGBA1C 6.2 05/06/2024    HGBA1C 6.1 (H) 05/03/2023     BMP  Lab Results   Component Value Date    CALCIUM 8.8 12/20/2024     12/20/2024    K 4.1 12/20/2024    CO2 30 12/20/2024     12/20/2024    BUN 17 12/20/2024    CREATININE 0.91 12/20/2024    EGFR >90 12/20/2024     LFTs  Lab Results   Component Value Date    ALT 37 12/20/2024    AST 23 12/20/2024    ALKPHOS 86 12/20/2024    BILITOT 1.1 12/20/2024     FLP  Lab Results   Component Value Date    TRIG 70 12/20/2024    CHOL 230 (H) 12/20/2024    LDLCALC 167 (H) 12/20/2024    HDL 48.6 12/20/2024     Urine Microalbumin  No results found for: \"MICROALBCREA\"  Weight Management  Wt Readings from Last 3 Encounters:   12/30/24 146 kg (322 lb)   05/06/24 143 kg (314 lb 3.2 oz)   07/24/23 134 kg (295 lb 3.2 oz)      There is no height or weight on file to calculate BMI.     Assessment/Plan   Problem List Items Addressed This Visit       Type 2 diabetes mellitus without complication, without long-term current use of insulin (Multi)     Patient's goal A1c is < 7%.  Is pt at goal? Yes, 6.4  Patient's SMBGs are unknown at this time. " Patient with A1C that has been increasing. Tolerating well.  Rationale for plan: Increase Mounjaro.    Medication Changes:  INCREASE  Mounjaro 7.5 to 10 mg once weekly           Relevant Medications    tirzepatide (Mounjaro) 10 mg/0.5 mL pen injector    Other Relevant Orders    Referral to Clinical Pharmacy     Clinical Pharmacist follow-up: 4/18 at 11 am, Telehealth visit    Continue all meds under the continuation of care with the referring provider and clinical pharmacy team.    Thank you,  Anusha Barahona, PharmD, [facility]  Clinical Pharmacy Specialist  [phone]    Verbal consent to manage patient's drug therapy was obtained from the patient. They were informed they may decline to participate or withdraw from participation in pharmacy services at any time.

## 2025-03-20 ENCOUNTER — APPOINTMENT (OUTPATIENT)
Dept: PHARMACY | Facility: HOSPITAL | Age: 60
End: 2025-03-20
Payer: COMMERCIAL

## 2025-03-20 DIAGNOSIS — E11.9 TYPE 2 DIABETES MELLITUS WITHOUT COMPLICATION, WITHOUT LONG-TERM CURRENT USE OF INSULIN (MULTI): ICD-10-CM

## 2025-03-20 RX ORDER — TIRZEPATIDE 10 MG/.5ML
10 INJECTION, SOLUTION SUBCUTANEOUS
Qty: 2 ML | Refills: 11 | Status: SHIPPED | OUTPATIENT
Start: 2025-03-20

## 2025-03-20 NOTE — ASSESSMENT & PLAN NOTE
Patient's goal A1c is < 7%.  Is pt at goal? Yes, 6.4  Patient's SMBGs are unknown at this time. Patient with A1C that has been increasing. Tolerating well.  Rationale for plan: Increase Mounjaro.    Medication Changes:  INCREASE  Mounjaro 7.5 to 10 mg once weekly

## 2025-04-18 ENCOUNTER — APPOINTMENT (OUTPATIENT)
Dept: PHARMACY | Facility: HOSPITAL | Age: 60
End: 2025-04-18
Payer: COMMERCIAL

## 2025-04-18 DIAGNOSIS — E11.9 TYPE 2 DIABETES MELLITUS WITHOUT COMPLICATION, WITHOUT LONG-TERM CURRENT USE OF INSULIN: ICD-10-CM

## 2025-04-18 NOTE — PROGRESS NOTES
Clinical Pharmacy Appointment    Patient ID: Campos Robison is a 60 y.o. male who presents for Diabetes.    Pt is here for Follow Up appointment.     Referring Provider/PCP: Paul Escobar,   Last visit with PCP: 12/30/24   Next visit with PCP: Not yet scheduled      Subjective     HPI  DIABETES MELLITUS TYPE II:    Diagnosed with diabetes: 2021.   Known diabetic complications: None.  Does patient follow with Endocrinology: No  Last optometry exam: Occupational exams regularly  Most recent visit in Podiatry: Unknown  Starting Weight: 315 lbs  Current Weight: 282 lbs  Goal Weight: 225 lbs    Current diabetic medications include:  Mounjaro 10 mg once weekly on Tuesdays x 3    Clarifications to above regimen: None  Adverse Effects: None    Past diabetic medications include:  None    Glucose Readings:  Glucometer/CGM Type: Glucometer  Patient tests BG 0 times per day    Current home BG readings (mg/dL): Unknown   Previous home BG readings (mg/dL): N/A    Any episodes of hypoglycemia? No,   .  Did patient treat episode of hypoglycemia appropriately? N/A  Does the patient have a prescription for ready-to-use Glucagon? Not on insulin  Does pt have proteinuria? Unknown    Lifestyle: (Not hungry)  Diet: 2 meals/day.  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 Yes  ACE-I/ARB? No  Aspirin? No    Pertinent PMH Review:  PMH of Pancreatitis: No  PMH of Retinopathy: No  PMH of Urinary Tract Infections: No  PMH of MTC: No    Immunizations:  Influenza? No  COVID? No  Pneumonia? No  Shingles? No  Hepatitis B? No    Medication Reconciliation:  Changed:   Added:   Discontinued:     Drug Interactions  No relevant drug interactions were noted.    Medication System Management  [facility]  Adherence/Organization: No issues reported  Affordability/Accessibility: Copay for Mounjaro $301. Sent link for copay card, patient needs to sign up for.      Objective   No Known Allergies  Social History     Social History Narrative    Not on file      Medication Review  Current Outpatient Medications   Medication Instructions    atorvastatin (LIPITOR) 10 mg, oral, Daily    metoprolol succinate XL (TOPROL-XL) 25 mg, oral, Daily    Mounjaro 10 mg, subcutaneous, Every 7 days    sildenafil (REVATIO) 20 mg, oral, Daily PRN, 1-5 tablets    Xarelto 20 mg, oral, Daily, Take with food.      Vitals  BP Readings from Last 2 Encounters:   12/30/24 134/82   05/06/24 140/84     BMI Readings from Last 1 Encounters:   12/30/24 44.91 kg/m²      Labs  A1C  Lab Results   Component Value Date    HGBA1C 6.4 12/30/2024    HGBA1C 6.2 05/06/2024    HGBA1C 6.1 (H) 05/03/2023     BMP  Lab Results   Component Value Date    CALCIUM 8.8 12/20/2024     12/20/2024    K 4.1 12/20/2024    CO2 30 12/20/2024     12/20/2024    BUN 17 12/20/2024    CREATININE 0.91 12/20/2024    EGFR >90 12/20/2024     LFTs  Lab Results   Component Value Date    ALT 37 12/20/2024    AST 23 12/20/2024    ALKPHOS 86 12/20/2024    BILITOT 1.1 12/20/2024     FLP  Lab Results   Component Value Date    TRIG 70 12/20/2024    CHOL 230 (H) 12/20/2024    LDLCALC 167 (H) 12/20/2024    HDL 48.6 12/20/2024     Urine Microalbumin  No results found for: \"MICROALBCREA\"  Weight Management  Wt Readings from Last 3 Encounters:   12/30/24 146 kg (322 lb)   05/06/24 143 kg (314 lb 3.2 oz)   07/24/23 134 kg (295 lb 3.2 oz)      There is no height or weight on file to calculate BMI.     Assessment/Plan   Problem List Items Addressed This Visit       Type 2 diabetes mellitus without complication, without long-term current use of insulin    Patient's goal A1c is < 7%.  Is pt at goal? Yes, 6.4  Patient's SMBGs are unknown at this time. Patient with A1C that has been " increasing. Tolerating well. Current regimen includes Mounjaro. Patient's current weight reported as 282. Has lost 33 lbs (10% of TBW) since starting therapy plan. Due to tolerance and effectiveness, plan to continue Mounjaro.    Rationale for plan: Continue Mounjaro.    Medication Changes:  CONTINUE  Mounjaro 10 mg once weekly              Clinical Pharmacist follow-up: 5/16 at 11 am, Telehealth visit    Continue all meds under the continuation of care with the referring provider and clinical pharmacy team.    Thank you,  Anusha Barahona, PharmD, [facility]  Clinical Pharmacy Specialist  [phone]    Verbal consent to manage patient's drug therapy was obtained from the patient. They were informed they may decline to participate or withdraw from participation in pharmacy services at any time.

## 2025-04-18 NOTE — ASSESSMENT & PLAN NOTE
Patient's goal A1c is < 7%.  Is pt at goal? Yes, 6.4  Patient's SMBGs are unknown at this time. Patient with A1C that has been increasing. Tolerating well. Current regimen includes Mounjaro. Patient's current weight reported as 282. Has lost 33 lbs (10% of TBW) since starting therapy plan. Due to tolerance and effectiveness, plan to continue Mounjaro.    Rationale for plan: Continue Mounjaro.    Medication Changes:  CONTINUE  Mounjaro 10 mg once weekly

## 2025-05-13 ENCOUNTER — TELEPHONE (OUTPATIENT)
Dept: PRIMARY CARE | Facility: CLINIC | Age: 60
End: 2025-05-13
Payer: COMMERCIAL

## 2025-05-13 NOTE — TELEPHONE ENCOUNTER
Pt is wanting to know is dr Vaughn can see him because his wife and child see her already, he just needs an appt to get established/med check appt pt drives a school bus so please leave a detailed VM to pt

## 2025-05-16 ENCOUNTER — TELEMEDICINE (OUTPATIENT)
Dept: PHARMACY | Facility: HOSPITAL | Age: 60
End: 2025-05-16
Payer: COMMERCIAL

## 2025-05-16 DIAGNOSIS — E11.9 TYPE 2 DIABETES MELLITUS WITHOUT COMPLICATION, WITHOUT LONG-TERM CURRENT USE OF INSULIN: Primary | ICD-10-CM

## 2025-05-16 DIAGNOSIS — E11.9 TYPE 2 DIABETES MELLITUS WITHOUT COMPLICATION, WITHOUT LONG-TERM CURRENT USE OF INSULIN: ICD-10-CM

## 2025-05-16 NOTE — PROGRESS NOTES
Clinical Pharmacy Appointment    Patient ID: Campos Robison is a 60 y.o. male who presents for Diabetes.    Pt is here for Follow Up appointment.     Referring Provider/PCP: Esme Vaughn MD  Last visit with PCP: 12/30/24   Next visit with PCP: 1/6/26      Subjective     HPI  DIABETES MELLITUS TYPE II:    Diagnosed with diabetes: 2021.   Known diabetic complications: None.  Does patient follow with Endocrinology: No  Last optometry exam: Occupational exams regularly  Most recent visit in Podiatry: Unknown  Starting Weight: 315 lbs  Current Weight: 275 lbs  Goal Weight: 225 lbs    Current diabetic medications include:  Mounjaro 10 mg once weekly on Tuesdays    Clarifications to above regimen: None  Adverse Effects: None    Past diabetic medications include:  None    Glucose Readings:  Glucometer/CGM Type: Glucometer  Patient tests BG 0 times per day    Current home BG readings (mg/dL): Unknown   Previous home BG readings (mg/dL): N/A    Any episodes of hypoglycemia? No,  .  Did patient treat episode of hypoglycemia appropriately? N/A  Does the patient have a prescription for ready-to-use Glucagon? Not on insulin  Does pt have proteinuria? Unknown    Lifestyle: (Not hungry)  Diet: 2 meals/day.  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 Yes  ACE-I/ARB? No  Aspirin? No    Pertinent PMH Review:  PMH of Pancreatitis: No  PMH of Retinopathy: No  PMH of Urinary Tract Infections: No  PMH of MTC: No    Immunizations:  Influenza? No  COVID? No  Pneumonia? No  Shingles? No  Hepatitis B? No    Medication Reconciliation:  Changed:   Added:   Discontinued:     Drug Interactions  No relevant drug interactions were noted.    Medication System Management  Patients preferred pharmacy: [facility]  Adherence/Organization:  "No issues reported  Affordability/Accessibility: Copay for Mounjaro $301. Sent link for copay card, patient needs to sign up for.      Objective   No Known Allergies  Social History     Social History Narrative    Not on file      Medication Review  Current Outpatient Medications   Medication Instructions    atorvastatin (LIPITOR) 10 mg, oral, Daily    metoprolol succinate XL (TOPROL-XL) 25 mg, oral, Daily    Mounjaro 10 mg, subcutaneous, Every 7 days    sildenafil (REVATIO) 20 mg, oral, Daily PRN, 1-5 tablets    Xarelto 20 mg, oral, Daily, Take with food.      Vitals  BP Readings from Last 2 Encounters:   12/30/24 134/82   05/06/24 140/84     BMI Readings from Last 1 Encounters:   12/30/24 44.91 kg/m²      Labs  A1C  Lab Results   Component Value Date    HGBA1C 6.4 12/30/2024    HGBA1C 6.2 05/06/2024    HGBA1C 6.1 (H) 05/03/2023     BMP  Lab Results   Component Value Date    CALCIUM 8.8 12/20/2024     12/20/2024    K 4.1 12/20/2024    CO2 30 12/20/2024     12/20/2024    BUN 17 12/20/2024    CREATININE 0.91 12/20/2024    EGFR >90 12/20/2024     LFTs  Lab Results   Component Value Date    ALT 37 12/20/2024    AST 23 12/20/2024    ALKPHOS 86 12/20/2024    BILITOT 1.1 12/20/2024     FLP  Lab Results   Component Value Date    TRIG 70 12/20/2024    CHOL 230 (H) 12/20/2024    LDLCALC 167 (H) 12/20/2024    HDL 48.6 12/20/2024     Urine Microalbumin  No results found for: \"MICROALBCREA\"  Weight Management  Wt Readings from Last 3 Encounters:   12/30/24 146 kg (322 lb)   05/06/24 143 kg (314 lb 3.2 oz)   07/24/23 134 kg (295 lb 3.2 oz)      There is no height or weight on file to calculate BMI.     Assessment/Plan   Problem List Items Addressed This Visit       Type 2 diabetes mellitus without complication, without long-term current use of insulin    Patient's goal A1c is < 7%.  Is pt at goal? Yes, 6.4  Patient's SMBGs are unknown at this time. Current regimen includes Mounjaro. Patient's current weight reported " as 275. Has lost 40 lbs (12.6% of TBW) since starting therapy plan. Due to tolerance and effectiveness, plan to continue Mounjaro.    Rationale for plan: Continue Mounjaro.    Medication Changes:  CONTINUE  Mounjaro 10 mg once weekly            Clinical Pharmacist follow-up: 6/13, Telehealth visit    Continue all meds under the continuation of care with the referring provider and clinical pharmacy team.    Thank you,  Anusha Barahona, PharmD, [facility]  Clinical Pharmacy Specialist  [phone]    Verbal consent to manage patient's drug therapy was obtained from the patient. They were informed they may decline to participate or withdraw from participation in pharmacy services at any time.

## 2025-05-16 NOTE — Clinical Note
Patient to establish care with you in January (first available given to him). Weight loss/diabetes. On Mounjaro 10 mg. Doing well and lost 7-8 lbs in the last 4 weeks. Will continue on 10 mg at this time

## 2025-05-16 NOTE — ASSESSMENT & PLAN NOTE
Patient's goal A1c is < 7%.  Is pt at goal? Yes, 6.4  Patient's SMBGs are unknown at this time. Current regimen includes Mounjaro. Patient's current weight reported as 275. Has lost 40 lbs (12.6% of TBW) since starting therapy plan. Due to tolerance and effectiveness, plan to continue Mounjaro.    Rationale for plan: Continue Mounjaro.    Medication Changes:  CONTINUE  Mounjaro 10 mg once weekly

## 2025-06-13 ENCOUNTER — APPOINTMENT (OUTPATIENT)
Dept: PHARMACY | Facility: HOSPITAL | Age: 60
End: 2025-06-13
Payer: COMMERCIAL

## 2025-06-13 DIAGNOSIS — E11.9 TYPE 2 DIABETES MELLITUS WITHOUT COMPLICATION, WITHOUT LONG-TERM CURRENT USE OF INSULIN: ICD-10-CM

## 2025-06-13 NOTE — PROGRESS NOTES
Clinical Pharmacy Appointment    Patient ID: Campos Robison is a 60 y.o. male who presents for Diabetes.    Pt is here for Follow Up appointment.     Referring Provider/PCP: Esme Vaughn MD  Last visit with PCP: 12/30/24   Next visit with PCP: 1/6/26      Subjective     DIABETES MELLITUS TYPE II:    Diagnosed with diabetes: 2021.   Known diabetic complications: None.  Does patient follow with Endocrinology: No  Last optometry exam: Occupational exams regularly  Most recent visit in Podiatry: Unknown  Starting Weight: 315 lbs  Current Weight: 270 lbs  Goal Weight: 225 lbs    Current diabetic medications include:  Mounjaro 10 mg once weekly on Tuesdays    Clarifications to above regimen: None  Adverse Effects: None, denies any GI side effects    Past diabetic medications include:  None    Glucose Readings:  Glucometer/CGM Type: Glucometer  Patient tests BG 0 times per day    Current home BG readings (mg/dL): Unknown   Previous home BG readings (mg/dL): N/A    Any episodes of hypoglycemia? No,  .  Did patient treat episode of hypoglycemia appropriately? N/A  Does the patient have a prescription for ready-to-use Glucagon? Not on insulin  Does pt have proteinuria? Unknown    Lifestyle: (Not hungry)  Diet: 2 meals/day.  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 Yes  ACE-I/ARB? No  Aspirin? No    Pertinent PMH Review:  PMH of Pancreatitis: No  PMH of Retinopathy: No  PMH of Urinary Tract Infections: No  PMH of MTC: No    Immunizations:  Influenza? No  COVID? No  Pneumonia? No  Shingles? No  Hepatitis B? No    Medication Reconciliation:  Changed:   Added:   Discontinued:     Drug Interactions  No relevant drug interactions were noted.    Medication System Management  [facility]  Adherence/Organization: No issues reported  Affordability/Accessibility: Copay for Mounjaro $301. Sent link for copay card, patient needs to sign up for.      Objective   No Known Allergies  Social History     Social History Narrative    Not on file      Medication Review  Current Outpatient Medications   Medication Instructions    atorvastatin (LIPITOR) 10 mg, oral, Daily    metoprolol succinate XL (TOPROL-XL) 25 mg, oral, Daily    Mounjaro 10 mg, subcutaneous, Every 7 days    sildenafil (REVATIO) 20 mg, oral, Daily PRN, 1-5 tablets    Xarelto 20 mg, oral, Daily, Take with food.      Vitals  BP Readings from Last 2 Encounters:   12/30/24 134/82   05/06/24 140/84     BMI Readings from Last 1 Encounters:   12/30/24 44.91 kg/m²      Labs  A1C  Lab Results   Component Value Date    HGBA1C 6.4 12/30/2024    HGBA1C 6.2 05/06/2024    HGBA1C 6.1 (H) 05/03/2023     BMP  Lab Results   Component Value Date    CALCIUM 8.8 12/20/2024     12/20/2024    K 4.1 12/20/2024    CO2 30 12/20/2024     12/20/2024    BUN 17 12/20/2024    CREATININE 0.91 12/20/2024    EGFR >90 12/20/2024     LFTs  Lab Results   Component Value Date    ALT 37 12/20/2024    AST 23 12/20/2024    ALKPHOS 86 12/20/2024    BILITOT 1.1 12/20/2024     FLP  Lab Results   Component Value Date    TRIG 70 12/20/2024    CHOL 230 (H) 12/20/2024    LDLCALC 167 (H) 12/20/2024    HDL 48.6 12/20/2024     Urine Microalbumin  No results found for: \"MICROALBCREA\"  Weight Management  Wt Readings from Last 3 Encounters:   12/30/24 146 kg (322 lb)   05/06/24 143 kg (314 lb 3.2 oz)   07/24/23 134 kg (295 lb 3.2 oz)      There is no height or weight on file to calculate BMI.     Assessment/Plan   Problem List Items Addressed This Visit       Type 2 diabetes mellitus without complication, without long-term current use of insulin       Patient's goal A1c is < 7%.  Is pt at goal? Yes, 6.4%  Patient's SMBGs are unknown at this time. Current regimen includes Mounjaro. " Patient's current weight reported as 270 lbs.  Has lost 45 lbs (14% of TBW) since starting therapy plan. Due to tolerance and continued effectiveness, plan to continue Mounjaro at current dosing for additional month.      Medication Changes:  CONTINUE  Mounjaro 10 mg once weekly  Counseled patient on MOA, expectations, side effects, duration of therapy, administration, and monitoring parameters.  Addressed all of patients questions and concerns at time of appointment. Encouraged to reach out to PharmD with additional needs.      Clinical Pharmacist follow-up: 7/15 09:40 AM, Telehealth visit    Continue all meds under the continuation of care with the referring provider and clinical pharmacy team.    Thank you,  Stefanie Andrews, PharmD  Clinical Pharmacy Specialist  [phone]    Verbal consent to manage patient's drug therapy was obtained from the patient. They were informed they may decline to participate or withdraw from participation in pharmacy services at any time.

## 2025-06-19 DIAGNOSIS — I48.0 PAROXYSMAL ATRIAL FIBRILLATION (MULTI): ICD-10-CM

## 2025-06-19 NOTE — TELEPHONE ENCOUNTER
He has appt to est with Dr. Vaughn but it is in January, Last TE said for him to call back and schedule at end of July.    Received fax requesting refill.

## 2025-06-20 NOTE — TELEPHONE ENCOUNTER
MED REFILL    LOV; 24  NOV; 26    [id number] - [place], [place] - [address] [place] [address]  Phone: [phone]  Fax: [phone]     Dispense Quantity: 90 tablet (90 day supply)     metoprolol succinate XL (Toprol-XL) 25 mg 24 hr tablet Take 1 tablet (25 mg) by mouth once daily.   Number of times this order has been changed since signin       Pt arrives to ED c/o body aches.  Reports housing starts on Monday.  Requesting tylenol.

## 2025-06-23 RX ORDER — METOPROLOL SUCCINATE 25 MG/1
25 TABLET, EXTENDED RELEASE ORAL DAILY
Qty: 90 TABLET | Refills: 1 | Status: SHIPPED | OUTPATIENT
Start: 2025-06-23

## 2025-07-09 DIAGNOSIS — E78.00 PURE HYPERCHOLESTEROLEMIA: ICD-10-CM

## 2025-07-09 DIAGNOSIS — E11.9 TYPE 2 DIABETES MELLITUS WITHOUT COMPLICATION, WITHOUT LONG-TERM CURRENT USE OF INSULIN: ICD-10-CM

## 2025-07-11 RX ORDER — ATORVASTATIN CALCIUM 10 MG/1
10 TABLET, FILM COATED ORAL DAILY
Qty: 90 TABLET | Refills: 0 | Status: SHIPPED | OUTPATIENT
Start: 2025-07-11 | End: 2025-10-09

## 2025-07-15 ENCOUNTER — APPOINTMENT (OUTPATIENT)
Dept: PHARMACY | Facility: HOSPITAL | Age: 60
End: 2025-07-15
Payer: COMMERCIAL

## 2025-08-19 ENCOUNTER — APPOINTMENT (OUTPATIENT)
Dept: PHARMACY | Facility: HOSPITAL | Age: 60
End: 2025-08-19
Payer: COMMERCIAL

## 2025-08-19 DIAGNOSIS — E11.9 TYPE 2 DIABETES MELLITUS WITHOUT COMPLICATION, WITHOUT LONG-TERM CURRENT USE OF INSULIN: ICD-10-CM

## 2025-08-19 PROCEDURE — RXMED WILLOW AMBULATORY MEDICATION CHARGE

## 2025-08-19 RX ORDER — TIRZEPATIDE 12.5 MG/.5ML
12.5 INJECTION, SOLUTION SUBCUTANEOUS
Qty: 2 ML | Refills: 11 | Status: SHIPPED | OUTPATIENT
Start: 2025-08-19

## 2025-08-20 ENCOUNTER — PHARMACY VISIT (OUTPATIENT)
Dept: PHARMACY | Facility: CLINIC | Age: 60
End: 2025-08-20
Payer: COMMERCIAL

## 2025-09-02 ENCOUNTER — APPOINTMENT (OUTPATIENT)
Dept: PHARMACY | Facility: HOSPITAL | Age: 60
End: 2025-09-02
Payer: COMMERCIAL

## 2025-09-30 ENCOUNTER — APPOINTMENT (OUTPATIENT)
Dept: PHARMACY | Facility: HOSPITAL | Age: 60
End: 2025-09-30
Payer: COMMERCIAL

## 2025-11-06 ENCOUNTER — APPOINTMENT (OUTPATIENT)
Facility: CLINIC | Age: 60
End: 2025-11-06
Payer: COMMERCIAL

## 2026-01-06 ENCOUNTER — APPOINTMENT (OUTPATIENT)
Dept: PRIMARY CARE | Facility: CLINIC | Age: 61
End: 2026-01-06
Payer: COMMERCIAL